# Patient Record
Sex: FEMALE | Race: WHITE | Employment: FULL TIME | URBAN - METROPOLITAN AREA
[De-identification: names, ages, dates, MRNs, and addresses within clinical notes are randomized per-mention and may not be internally consistent; named-entity substitution may affect disease eponyms.]

---
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
## 2020-02-10 ENCOUNTER — HOSPITAL ENCOUNTER (EMERGENCY)
Age: 20
Discharge: HOME | End: 2020-02-10
Attending: EMERGENCY MEDICINE
Payer: COMMERCIAL

## 2020-02-10 VITALS
HEART RATE: 115 BPM | HEIGHT: 67 IN | SYSTOLIC BLOOD PRESSURE: 104 MMHG | TEMPERATURE: 98.4 F | BODY MASS INDEX: 19.03 KG/M2 | DIASTOLIC BLOOD PRESSURE: 73 MMHG | RESPIRATION RATE: 18 BRPM | OXYGEN SATURATION: 100 % | WEIGHT: 121.25 LBS

## 2020-02-10 LAB
BILIRUBIN URINE: NEGATIVE
BLOOD, URINE: NEGATIVE
CLARITY: ABNORMAL
COLOR: YELLOW
GLUCOSE URINE: NEGATIVE MG/DL
KETONES, URINE: NEGATIVE MG/DL
LEUKOCYTE ESTERASE, URINE: NEGATIVE
MICROSCOPIC EXAMINATION: ABNORMAL
NITRITE, URINE: NEGATIVE
PH UA: 6.5 (ref 5–8)
PROTEIN UA: NEGATIVE MG/DL
RAPID INFLUENZA  B AGN: POSITIVE
RAPID INFLUENZA A AGN: NEGATIVE
SPECIFIC GRAVITY UA: 1.02 (ref 1–1.03)
URINE REFLEX TO CULTURE: ABNORMAL
URINE TYPE: ABNORMAL
UROBILINOGEN, URINE: 2 E.U./DL

## 2020-02-10 PROCEDURE — 87804 INFLUENZA ASSAY W/OPTIC: CPT

## 2020-02-10 PROCEDURE — 99284 EMERGENCY DEPT VISIT MOD MDM: CPT

## 2020-02-10 PROCEDURE — 81003 URINALYSIS AUTO W/O SCOPE: CPT

## 2020-02-10 RX ORDER — ALBUTEROL SULFATE 90 UG/1
2 AEROSOL, METERED RESPIRATORY (INHALATION) EVERY 6 HOURS PRN
Qty: 1 INHALER | Refills: 0 | Status: SHIPPED | OUTPATIENT
Start: 2020-02-10

## 2020-02-10 RX ORDER — OSELTAMIVIR PHOSPHATE 75 MG/1
75 CAPSULE ORAL 2 TIMES DAILY
Qty: 10 CAPSULE | Refills: 0 | Status: SHIPPED | OUTPATIENT
Start: 2020-02-10 | End: 2020-02-15

## 2020-02-10 ASSESSMENT — PAIN DESCRIPTION - FREQUENCY: FREQUENCY: CONTINUOUS

## 2020-02-10 ASSESSMENT — PAIN DESCRIPTION - DESCRIPTORS: DESCRIPTORS: ACHING;SHOOTING;CONSTANT

## 2020-02-10 ASSESSMENT — PAIN - FUNCTIONAL ASSESSMENT
PAIN_FUNCTIONAL_ASSESSMENT: PREVENTS OR INTERFERES WITH MANY ACTIVE NOT PASSIVE ACTIVITIES
PAIN_FUNCTIONAL_ASSESSMENT: 0-10

## 2020-02-10 ASSESSMENT — PAIN SCALES - GENERAL
PAINLEVEL_OUTOF10: 8
PAINLEVEL_OUTOF10: 9

## 2020-02-10 ASSESSMENT — PAIN DESCRIPTION - ORIENTATION: ORIENTATION: LOWER;RIGHT

## 2020-02-10 ASSESSMENT — PAIN DESCRIPTION - PAIN TYPE: TYPE: ACUTE PAIN

## 2020-02-10 ASSESSMENT — PAIN DESCRIPTION - LOCATION: LOCATION: BACK

## 2020-02-10 ASSESSMENT — PAIN DESCRIPTION - ONSET: ONSET: AWAKENED FROM SLEEP

## 2020-02-10 NOTE — ED PROVIDER NOTES
[facility]  eMERGENCY dEPARTMENT eNCOUnter      Pt Name: Katia Umaña  MRN: [id number]  [place] 2000  Date of evaluation: 2/10/2020  Provider: Torrey Merchant MD    [address] [place] [address]       Chief Complaint   Patient presents with    Back Pain     c/o low back pain that shoots down to right hip and leg, no known injury, onset of sx. 0200    Nasal Congestion     onset x 2 days, headache, ear pain    Cough     onset 2 days ago. she is 16 weeks pregnant and works at [address] [place] [address] [employer] time was 0 minutes, excluding separately reportable procedures. There was a high probability of clinically significant/life threatening deterioration in the patient's condition which required my urgent intervention. HISTORY OF PRESENT ILLNESS  (Location/Symptom, Timing/Onset, Context/Setting, Quality, Duration, Modifying Factors, Severity.)   Katia Umaña is a 23 y.o. female who presents to the emergency department with complaints of nasal congestion, cough, and back pain. Her nasal congestion and cough started 2 days ago. Her cough is occasionally productive of some yellow sputum. No fever. No earache or sore throat. She has had clear nasal drainage. No body aches. Later this morning around 2 AM she developed some low back pain with sharp pain going down her right leg into her thigh. No numbness tingling or paresthesias. She is 16 weeks pregnant. No vaginal bleeding. She did note some burning occasionally when she urinates. Nursing Notes were reviewed and I agree. REVIEW OF SYSTEMS    (2-9 systems for level 4, 10 or more for level 5)     General: No fever or chills. Eyes: Tearing but no discharge. Mild redness. ENT: Clear nasal congestion. No earache or sore throat. Respiratory: Cough, occasionally productive of yellow sputum. GI: No abdominal pain nausea or vomiting.   : Occasional burning with status: None    Intimate partner violence:     Fear of current or ex partner: None     Emotionally abused: None     Physically abused: None     Forced sexual activity: None   Other Topics Concern    None   Social History Narrative    None         PHYSICAL EXAM    (up to 7 for level 4, 8 or more for level 5)     ED Triage Vitals [02/10/20 0841]   BP Temp Temp Source Pulse Resp SpO2 Height Weight   104/73 98.4 °F (36.9 °C) Oral 115 18 100 % 5' 7\" (1.702 m) 121 lb 4.1 oz (55 kg)       General: Alert thin white female no acute distress. Head: Atraumatic and normocephalic. Eyes: Mild conjunctival injection. No discharge. Pupils equal round reactive. No photophobia. ENT: TMs are normal.  Clear rhinorrhea from the nose. Oropharynx is moist without erythema. Neck: Supple without adenopathy, nontender. Heart: Regular rate and rhythm. No murmurs or gallops noted. Lungs: Breath sounds equal bilaterally with rare expiratory wheeze. Abdomen: Soft, nontender throughout. Palpable gravid uterus just below the umbilicus. Nontender. No flank tenderness. Musculoskeletal: No focal tenderness in the back. Full range of motion. Intact symmetrical distal pulses. No edema. Neuro: Awake, alert, oriented. Symmetrical motor function. Normal gait. Negative straight leg raises bilaterally.       DIFFERENTIAL DIAGNOSIS   Differential includes but is not limited to viral URI, influenza, bronchitis, asthma exacerbation, pneumonia, lumbar strain, sciatica, pyelonephritis      DIAGNOSTIC RESULTS     EKG: All EKG's are interpreted by Geovanny Stephens MD in the absence of a cardiologist.      RADIOLOGY:   Non-plain film images such as CT, Ultrasound and MRI are read by the radiologist. Plain radiographic images are visualized and preliminarily interpreted Geovanny Stephens MD with the below findings:      Interpretation per the Radiologist below, if available at the time of this note:    No orders to display ED BEDSIDE ULTRASOUND:   Performed by ED Physician - none    LABS:  Labs Reviewed   RAPID INFLUENZA A/B ANTIGENS - Abnormal; Notable for the following components:       Result Value    Rapid Influenza B Ag POSITIVE (*)     All other components within normal limits    Narrative:     Performed at:  [facility]) [facility]  [address],  [facility]   Phone [phone]   URINE RT REFLEX TO CULTURE - Abnormal; Notable for the following components:    Urobilinogen, Urine 2.0 (*)     All other components within normal limits    Narrative:     Performed at:  [facility]) [facility]  [address],  [place] [facility]   Phone [phone]       All other labs were within normal range or not returned as of this dictation. EMERGENCY DEPARTMENT COURSE and DIFFERENTIAL DIAGNOSIS/MDM:   Vitals:    Vitals:    02/10/20 0841   BP: 104/73   Pulse: 115   Resp: 18   Temp: 98.4 °F (36.9 °C)   TempSrc: Oral   SpO2: 100%   Weight: 121 lb 4.1 oz (55 kg)   Height: 5' 7\" (1.702 m)       This patient has had respiratory symptoms with congestion and cough for 2 days. No documented fever. She also has some low back pain with some sharp pain in her right leg. She has no clinical evidence of pneumonia on exam.  She does have some mild bronchospasm and has a history of asthma. Throat is benign, she has no obvious pharyngitis or tonsillitis. She has no evidence of otitis media on exam.  She clinically does not have sinusitis. Her influenza screen is positive for influenza B. Her symptoms of started within the last 48 hours that she is pregnant, she treated with Tamiflu. I will prescribe an albuterol inhaler for her mild bronchospasm associated with her influenza illness. She can use Sudafed as needed for congestion. We discussed treatment options for the sciatica.   She prefers conservative management at this time with rest, heat to the area, Tylenol as needed for pain. Her urinalysis shows no evidence of UTI. There is no hematuria to suggest ureterolithiasis. She actually has follow-up with her [facility] physician tomorrow. Test results, diagnosis, and treatment plan were discussed with the patient. She understands her treatment plan and follow-up as discussed. He does work at a  center. She was put off from work until next Monday. CONSULTS:  None    PROCEDURES:  None    FINAL IMPRESSION      1. Influenza B    2. Sciatica of right side          DISPOSITION/PLAN   DISPOSITION Decision To Discharge 02/10/2020 09:25:55 AM      PATIENT REFERRED TO:  Licha Ashton.   [address] [facility]  [phone]    In 1 day  As scheduled      DISCHARGE MEDICATIONS:  New Prescriptions    ALBUTEROL SULFATE HFA (PROAIR HFA) 108 (90 BASE) MCG/ACT INHALER    Inhale 2 puffs into the lungs every 6 hours as needed for Wheezing    OSELTAMIVIR (TAMIFLU) 75 MG CAPSULE    Take 1 capsule by mouth 2 times daily for 5 days       (Please note that portions of this note were completed with a voice recognition program.  Efforts were made to edit the dictations but occasionally words are mis-transcribed.)    Amber Laura MD  Attending Emergency Physician        Jennifer Dennison MD  02/10/20 6529

## 2020-02-10 NOTE — ED NOTES
Discharge and education instructions reviewed. Patient verbalized understanding, teach back successful. Patient denied questions at this time. Instructed to follow up with PCP and or return to ED if symptoms worsen. Ambulatory to exit with 2 prescriptions.  No change in assessment     Simon Talbert RN  02/10/20 0083

## 2020-02-10 NOTE — ED NOTES
Presents with back pain that shoots down her hip and right leg onset at 0200, no known injury. She is also c/o runny nose, headache, stomach ache. She works at a  and she is also 16 weeks pregnant. She has a mild cough. Skin is warm and dry, Color is appropriate for ethnicity.       Alonso Guzman RN  02/10/20 8389

## 2020-02-16 ENCOUNTER — HOSPITAL ENCOUNTER (EMERGENCY)
Age: 20
Discharge: HOME | End: 2020-02-16
Attending: EMERGENCY MEDICINE
Payer: COMMERCIAL

## 2020-02-16 VITALS
DIASTOLIC BLOOD PRESSURE: 70 MMHG | TEMPERATURE: 98.1 F | WEIGHT: 124 LBS | HEIGHT: 67 IN | HEART RATE: 63 BPM | BODY MASS INDEX: 19.46 KG/M2 | OXYGEN SATURATION: 100 % | SYSTOLIC BLOOD PRESSURE: 113 MMHG | RESPIRATION RATE: 16 BRPM

## 2020-02-16 PROCEDURE — 99283 EMERGENCY DEPT VISIT LOW MDM: CPT

## 2020-02-16 RX ORDER — AMOXICILLIN 875 MG/1
875 TABLET, COATED ORAL 2 TIMES DAILY
Qty: 20 TABLET | Refills: 0 | Status: SHIPPED | OUTPATIENT
Start: 2020-02-16 | End: 2020-02-26

## 2020-02-16 ASSESSMENT — PAIN DESCRIPTION - DESCRIPTORS: DESCRIPTORS: ACHING

## 2020-02-16 ASSESSMENT — PAIN SCALES - GENERAL
PAINLEVEL_OUTOF10: 0
PAINLEVEL_OUTOF10: 8

## 2020-02-16 ASSESSMENT — PAIN DESCRIPTION - FREQUENCY: FREQUENCY: CONTINUOUS

## 2020-02-16 ASSESSMENT — PAIN DESCRIPTION - ORIENTATION: ORIENTATION: RIGHT;LEFT

## 2020-02-16 ASSESSMENT — PAIN DESCRIPTION - LOCATION: LOCATION: EAR

## 2020-02-16 ASSESSMENT — PAIN DESCRIPTION - PAIN TYPE: TYPE: ACUTE PAIN

## 2020-02-17 NOTE — ED NOTES
Gave patient discharge instructions. She states understanding.  Patient discharged to home     Jessica Gomez RN  02/16/20 9722

## 2022-03-09 ENCOUNTER — OFFICE VISIT (OUTPATIENT)
Dept: ENT CLINIC | Age: 22
End: 2022-03-09
Payer: COMMERCIAL

## 2022-03-09 VITALS
WEIGHT: 135 LBS | SYSTOLIC BLOOD PRESSURE: 110 MMHG | HEART RATE: 87 BPM | BODY MASS INDEX: 21.14 KG/M2 | DIASTOLIC BLOOD PRESSURE: 68 MMHG

## 2022-03-09 DIAGNOSIS — J35.01 CHRONIC TONSILLITIS: Primary | ICD-10-CM

## 2022-03-09 DIAGNOSIS — J03.91 RECURRENT TONSILLITIS: ICD-10-CM

## 2022-03-09 PROCEDURE — G8427 DOCREV CUR MEDS BY ELIG CLIN: HCPCS | Performed by: OTOLARYNGOLOGY

## 2022-03-09 PROCEDURE — G8420 CALC BMI NORM PARAMETERS: HCPCS | Performed by: OTOLARYNGOLOGY

## 2022-03-09 PROCEDURE — G8484 FLU IMMUNIZE NO ADMIN: HCPCS | Performed by: OTOLARYNGOLOGY

## 2022-03-09 PROCEDURE — 99204 OFFICE O/P NEW MOD 45 MIN: CPT | Performed by: OTOLARYNGOLOGY

## 2022-03-09 PROCEDURE — 1036F TOBACCO NON-USER: CPT | Performed by: OTOLARYNGOLOGY

## 2022-03-09 NOTE — PROGRESS NOTES
[facility]      Patient Name: Jose J Oconnor Record Number:  <[id number]  Primary Care Physician:  No primary care provider on file. Date of Consultation: 3/9/2022    Chief Complaint: Tonsil issues        HISTORY OF PRESENT ILLNESS  Tariq Clark is a(n) 25 y.o. female who presents for evaluation of recurrent tonsil issues. Patient says that for the past couple of years she has been getting recurrent tonsillitis. She says that she would estimate 12-13 episodes of tonsillitis treated by antibiotics in the past 2 years. She said that she thinks it started right after she was pregnant. In addition she does work at a . Between episodes of tonsillitis she does have a sore throat. She has this most days. Worse in the morning. She said that when she was younger she would get strep throat, but this stopped for a while until 2 years ago. REVIEW OF SYSTEMS  As above    PHYSICAL EXAM  GENERAL: No Acute Distress, Alert and Oriented, no Hoarseness, strong voice  EYES: EOMI, Anti-icteric  HENT:   Head: Normocephalic and atraumatic. Face:  Symmetric, facial nerve intact, no sinus tenderness  Right Ear: Normal external ear, normal external auditory canal, intact tympanic membrane with normal mobility and aerated middle ear  Left Ear: Normal external ear, normal external auditory canal, intact tympanic membrane with normal mobility and aerated middle ear  Mouth/Oral Cavity:  normal lips, Uvula is midline, no mucosal lesions, no trismus  Oropharynx/Larynx:  normal oropharynx, large cryptic tonsils  Nose:Normal external nasal appearance. NECK: Normal range of motion, no thyromegaly, trachea is midline, no lymphadenopathy, no neck masses, no crepitus          ASSESSMENT/PLAN  1. Chronic tonsillitis  The patient has sore throats almost daily for months without other obvious cause.   I think that she would benefit from a tonsillectomy and possibly an adenoidectomy. she understands the risk of a tonsillectomy including postoperative pain and bleeding. She is agreed to proceed    2. Recurrent tonsillitis  Patient also meets the definition of recurrent tonsillitis as she has had 5-6 episodes a year for the past 2 years. Plan tonsillectomy and possible adenoidectomy depending on intraoperative exam             I have performed a head and neck physical exam personally or was physically present during the key or critical portions of the service. This note was generated completely or in part utilizing Dragon dictation speech recognition software. Occasionally, words are mistranscribed and despite editing, the text may contain inaccuracies due to incorrect word recognition. If further clarification is needed please contact the office at [phone].

## 2022-03-24 ENCOUNTER — TELEPHONE (OUTPATIENT)
Dept: ENT CLINIC | Age: 22
End: 2022-03-24

## 2022-03-25 ENCOUNTER — TELEPHONE (OUTPATIENT)
Dept: ENT CLINIC | Age: 22
End: 2022-03-25

## 2022-03-25 NOTE — TELEPHONE ENCOUNTER
Spoke to pt and she is not ready to have the surgery on 04/05/22 she will call back when she is ready

## 2024-01-02 ENCOUNTER — HOSPITAL ENCOUNTER (EMERGENCY)
Age: 24
Discharge: HOME | End: 2024-01-02
Attending: EMERGENCY MEDICINE
Payer: COMMERCIAL

## 2024-01-02 ENCOUNTER — APPOINTMENT (OUTPATIENT)
Dept: GENERAL RADIOLOGY | Age: 24
End: 2024-01-02
Payer: COMMERCIAL

## 2024-01-02 VITALS
BODY MASS INDEX: 27.07 KG/M2 | WEIGHT: 168.43 LBS | TEMPERATURE: 98.7 F | HEIGHT: 66 IN | RESPIRATION RATE: 18 BRPM | HEART RATE: 69 BPM | DIASTOLIC BLOOD PRESSURE: 68 MMHG | OXYGEN SATURATION: 98 % | SYSTOLIC BLOOD PRESSURE: 109 MMHG

## 2024-01-02 DIAGNOSIS — R07.9 CHEST PAIN, UNSPECIFIED TYPE: Primary | ICD-10-CM

## 2024-01-02 LAB
ANION GAP SERPL CALCULATED.3IONS-SCNC: 9 MMOL/L (ref 3–16)
BASOPHILS # BLD: 0 K/UL (ref 0–0.2)
BASOPHILS NFR BLD: 0.2 %
BUN SERPL-MCNC: 8 MG/DL (ref 7–20)
CALCIUM SERPL-MCNC: 9.5 MG/DL (ref 8.3–10.6)
CHLORIDE SERPL-SCNC: 104 MMOL/L (ref 99–110)
CO2 SERPL-SCNC: 28 MMOL/L (ref 21–32)
CREAT SERPL-MCNC: 0.7 MG/DL (ref 0.6–1.1)
D DIMER: 0.33 UG/ML FEU (ref 0–0.6)
DEPRECATED RDW RBC AUTO: 13 % (ref 12.4–15.4)
EOSINOPHIL # BLD: 0.3 K/UL (ref 0–0.6)
EOSINOPHIL NFR BLD: 3.4 %
FLUAV RNA UPPER RESP QL NAA+PROBE: NEGATIVE
FLUBV AG NPH QL: NEGATIVE
GFR SERPLBLD CREATININE-BSD FMLA CKD-EPI: >60 ML/MIN/{1.73_M2}
GLUCOSE SERPL-MCNC: 88 MG/DL (ref 70–99)
HCG SERPL QL: NEGATIVE
HCT VFR BLD AUTO: 37.3 % (ref 36–48)
HGB BLD-MCNC: 12.2 G/DL (ref 12–16)
IMM GRANULOCYTES # BLD: 0 K/UL (ref 0–0.2)
IMM GRANULOCYTES NFR BLD: 0.1 %
LYMPHOCYTES # BLD: 3.3 K/UL (ref 1–5.1)
LYMPHOCYTES NFR BLD: 39 %
MCH RBC QN AUTO: 27.7 PG (ref 26–34)
MCHC RBC AUTO-ENTMCNC: 32.7 G/DL (ref 32–36.4)
MCV RBC AUTO: 84.8 FL (ref 80–100)
MONOCYTES # BLD: 0.6 K/UL (ref 0–1.3)
MONOCYTES NFR BLD: 6.6 %
NEUTROPHILS # BLD: 4.2 K/UL (ref 1.7–7.7)
NEUTROPHILS NFR BLD: 50.7 %
PLATELET # BLD AUTO: 299 K/UL (ref 135–450)
PMV BLD AUTO: 9.8 FL (ref 5–10.5)
POTASSIUM SERPL-SCNC: 3.6 MMOL/L (ref 3.5–5.1)
RBC # BLD AUTO: 4.4 M/UL (ref 4–5.2)
SODIUM SERPL-SCNC: 141 MMOL/L (ref 136–145)
TROPONIN, HIGH SENSITIVITY: <6 NG/L (ref 0–14)
WBC # BLD AUTO: 8.3 K/UL (ref 4–11)

## 2024-01-02 PROCEDURE — 87804 INFLUENZA ASSAY W/OPTIC: CPT

## 2024-01-02 PROCEDURE — 36415 COLL VENOUS BLD VENIPUNCTURE: CPT

## 2024-01-02 PROCEDURE — 84484 ASSAY OF TROPONIN QUANT: CPT

## 2024-01-02 PROCEDURE — 71046 X-RAY EXAM CHEST 2 VIEWS: CPT

## 2024-01-02 PROCEDURE — 85379 FIBRIN DEGRADATION QUANT: CPT

## 2024-01-02 PROCEDURE — 80048 BASIC METABOLIC PNL TOTAL CA: CPT

## 2024-01-02 PROCEDURE — 85025 COMPLETE CBC W/AUTO DIFF WBC: CPT

## 2024-01-02 PROCEDURE — 93005 ELECTROCARDIOGRAM TRACING: CPT | Performed by: EMERGENCY MEDICINE

## 2024-01-02 PROCEDURE — 84703 CHORIONIC GONADOTROPIN ASSAY: CPT

## 2024-01-02 PROCEDURE — 99285 EMERGENCY DEPT VISIT HI MDM: CPT

## 2024-01-02 ASSESSMENT — PAIN SCALES - GENERAL
PAINLEVEL_OUTOF10: 5
PAINLEVEL_OUTOF10: 6
PAINLEVEL_OUTOF10: 2

## 2024-01-02 ASSESSMENT — PAIN DESCRIPTION - LOCATION
LOCATION: CHEST

## 2024-01-02 ASSESSMENT — PAIN DESCRIPTION - ORIENTATION
ORIENTATION: LEFT
ORIENTATION: LEFT

## 2024-01-02 ASSESSMENT — LIFESTYLE VARIABLES
HOW OFTEN DO YOU HAVE A DRINK CONTAINING ALCOHOL: NEVER
HOW MANY STANDARD DRINKS CONTAINING ALCOHOL DO YOU HAVE ON A TYPICAL DAY: PATIENT DOES NOT DRINK

## 2024-01-02 ASSESSMENT — PAIN DESCRIPTION - DESCRIPTORS
DESCRIPTORS: SHARP;STABBING
DESCRIPTORS: SHARP;STABBING

## 2024-01-02 ASSESSMENT — PAIN - FUNCTIONAL ASSESSMENT: PAIN_FUNCTIONAL_ASSESSMENT: 0-10

## 2024-01-03 LAB
EKG ATRIAL RATE: 76 BPM
EKG DIAGNOSIS: NORMAL
EKG P AXIS: 38 DEGREES
EKG P-R INTERVAL: 130 MS
EKG Q-T INTERVAL: 366 MS
EKG QRS DURATION: 76 MS
EKG QTC CALCULATION (BAZETT): 411 MS
EKG R AXIS: 67 DEGREES
EKG T AXIS: 18 DEGREES
EKG VENTRICULAR RATE: 76 BPM

## 2024-01-03 PROCEDURE — 93010 ELECTROCARDIOGRAM REPORT: CPT | Performed by: INTERNAL MEDICINE

## 2024-01-03 NOTE — ED NOTES
Patient sinus rhythm on cardiac monitor upon discharge. Discharge instructions reviewed with patient and verbalized understanding, denies further questions and successful teach back occurred. Offered wheelchair for discharge and declined. Discharged ambulatory with steady gait to ED lobby. Written discharge instructions and referral for cardiology provided to patient.

## 2024-01-03 NOTE — ED NOTES
Initial contact with patient at this time. Patient transferred to Room 3, ambulated with slow steady gait. assisted into hospital gown and placed on cardiac monitor. Patient is sinus rhythm on monitor. She is awake, alert, oriented, respirations easy & regular, skin w/d, cap refill brisk.

## 2024-01-03 NOTE — ED PROVIDER NOTES
nonspecific T wave abnormalities  ST Segments: normal  T Waves: Nonspecific abnormalities  Q Waves: None noted    Other findings: Motion artifact but EKG is readable    Compared to EKG on: 7/10/2019 and appears unchanged    Clinical Impression: Normal sinus rhythm with nonspecific T wave abnormalities.  There is motion artifact but EKG is readable.  This is compared to an EKG on 7/10/2019 and appears unchanged.    SOCRATES HOYOS, DO    RADIOLOGY/PROCEDURES  I personally reviewed the images for this case.  XR CHEST (2 VW)   Final Result   No acute cardiopulmonary process.              Vitals:    01/02/24 1942 01/02/24 1945 01/2000 01/02/24 2015   BP: 115/77 111/72 115/67 109/68   Pulse: 81 71 81 69   Resp: 17 16 19 18   Temp:       TempSrc:       SpO2: (!) 84% 100% 100% 98%   Weight:       Height:           Medications - No data to display    New Prescriptions    No medications on file       SEP-1 CORE MEASURE DATA  Exclusion criteria: the patient is NOT to be included for sepsis due to:  SIRS criteria are not met    Patient remained stable in the ED. patient's chest pain workup was negative.  Her chest x-ray was negative.  D-dimer was negative.  Troponin was negative.  She has been having chest pain for 2 days constant and therefore I feel initial troponin is adequate.  CBC is normal.  Kidney and liver functions are normal.  EKG as noted above and is nonacute.  Is unchanged from previous EKG.  Therefore, I feel she can be safely discharged.  I referred her to cardiology for further evaluation and treatment.  She was instructed return if any problems.  She was instructed to take Tylenol and ibuprofen for pain and fever.    Diagnostic considerations include but are not limited to:  Airway Obstruction, Epiglottitis, Mononucleosis, Retropharyngeal Abscess, Parapharyngeal Abscess, Pneumonia, Hypoxemia, Dehydration, COVID-19, strep pharyngitis, acute sinusitis, other.    EKG-ordered to rule out myocardial

## 2024-01-03 NOTE — ED TRIAGE NOTES
Patient came to ER with complaints of left sided chest pain and pain radiates to back.  Patient stated it is a sharp stabbing pain.  Patient stated no nausea or vomiting.  Patient stated pain is worse with deep inspiration.

## 2024-01-03 NOTE — DISCHARGE INSTRUCTIONS
You may take Tylenol (acetaminophen) and/or Motrin (ibuprofen), if you are permitted to take these medications. Please follow package directions for the appropriate dosing and frequency.